# Patient Record
Sex: MALE | Race: WHITE | NOT HISPANIC OR LATINO
[De-identification: names, ages, dates, MRNs, and addresses within clinical notes are randomized per-mention and may not be internally consistent; named-entity substitution may affect disease eponyms.]

---

## 2017-03-06 ENCOUNTER — APPOINTMENT (OUTPATIENT)
Dept: HUMAN REPRODUCTION | Facility: CLINIC | Age: 42
End: 2017-03-06

## 2017-04-06 ENCOUNTER — APPOINTMENT (OUTPATIENT)
Dept: RADIOLOGY | Facility: CLINIC | Age: 42
End: 2017-04-06

## 2017-04-06 ENCOUNTER — OUTPATIENT (OUTPATIENT)
Dept: OUTPATIENT SERVICES | Facility: HOSPITAL | Age: 42
LOS: 1 days | End: 2017-04-06
Payer: COMMERCIAL

## 2017-04-06 DIAGNOSIS — Z00.8 ENCOUNTER FOR OTHER GENERAL EXAMINATION: ICD-10-CM

## 2017-04-06 PROCEDURE — 74020: CPT | Mod: 26

## 2017-04-06 PROCEDURE — 74020: CPT

## 2017-04-07 ENCOUNTER — APPOINTMENT (OUTPATIENT)
Dept: CT IMAGING | Facility: CLINIC | Age: 42
End: 2017-04-07

## 2017-04-07 ENCOUNTER — OUTPATIENT (OUTPATIENT)
Dept: OUTPATIENT SERVICES | Facility: HOSPITAL | Age: 42
LOS: 1 days | End: 2017-04-07
Payer: COMMERCIAL

## 2017-04-07 DIAGNOSIS — Z00.8 ENCOUNTER FOR OTHER GENERAL EXAMINATION: ICD-10-CM

## 2017-04-07 PROCEDURE — 74177 CT ABD & PELVIS W/CONTRAST: CPT

## 2020-11-19 ENCOUNTER — APPOINTMENT (OUTPATIENT)
Dept: RADIOLOGY | Facility: HOSPITAL | Age: 45
End: 2020-11-19
Payer: COMMERCIAL

## 2020-11-19 ENCOUNTER — RESULT REVIEW (OUTPATIENT)
Age: 45
End: 2020-11-19

## 2020-11-19 ENCOUNTER — OUTPATIENT (OUTPATIENT)
Dept: OUTPATIENT SERVICES | Facility: HOSPITAL | Age: 45
LOS: 1 days | End: 2020-11-19

## 2020-11-19 DIAGNOSIS — R10.11 RIGHT UPPER QUADRANT PAIN: ICD-10-CM

## 2020-11-19 DIAGNOSIS — K29.00 ACUTE GASTRITIS WITHOUT BLEEDING: ICD-10-CM

## 2020-11-19 PROCEDURE — 74220 X-RAY XM ESOPHAGUS 1CNTRST: CPT | Mod: 26

## 2022-12-27 ENCOUNTER — APPOINTMENT (OUTPATIENT)
Dept: ORTHOPEDIC SURGERY | Facility: CLINIC | Age: 47
End: 2022-12-27

## 2022-12-27 VITALS — HEIGHT: 74 IN | WEIGHT: 198 LBS | BODY MASS INDEX: 25.41 KG/M2

## 2022-12-27 DIAGNOSIS — M25.551 PAIN IN RIGHT HIP: ICD-10-CM

## 2022-12-27 DIAGNOSIS — S43.432A SUPERIOR GLENOID LABRUM LESION OF LEFT SHOULDER, INITIAL ENCOUNTER: ICD-10-CM

## 2022-12-27 DIAGNOSIS — M25.851 OTHER SPECIFIED JOINT DISORDERS, RIGHT HIP: ICD-10-CM

## 2022-12-27 PROCEDURE — 99203 OFFICE O/P NEW LOW 30 MIN: CPT

## 2022-12-27 PROCEDURE — 73502 X-RAY EXAM HIP UNI 2-3 VIEWS: CPT

## 2022-12-27 PROCEDURE — 73030 X-RAY EXAM OF SHOULDER: CPT | Mod: LT

## 2022-12-27 NOTE — PHYSICAL EXAM
[5___] : adduction 5[unfilled]/5 [Left] : left shoulder [There are no fractures, subluxations or dislocations. No significant abnormalities are seen] : There are no fractures, subluxations or dislocations. No significant abnormalities are seen [Right] : right hip with pelvis [AP] : anteroposterior [Lateral] : lateral [Mild arthritis (Tonnis Grade 1)] : Mild arthritis (Tonnis Grade 1) [de-identified] : positive biceps active test [] : no tenderness

## 2022-12-27 NOTE — DISCUSSION/SUMMARY
[de-identified] : Had shoulder injury 6 months ago, has been rehabbing it with PT and on his own. Needs MRI to r/o labral tear. The hip has mild OA already, activity modifications, NSAIDs discussed

## 2022-12-27 NOTE — HISTORY OF PRESENT ILLNESS
[Gradual] : gradual [7] : 7 [5] : 5 [Dull/Aching] : dull/aching [Intermittent] : intermittent [Nothing helps with pain getting better] : Nothing helps with pain getting better [de-identified] : Injured left shoulder doing weights about 6 months ago. Continues to have pain with certain motions, has had to modify his weight lifting. No clicking or popping, no neck pain at present. Also longer standing right hip pain ryan with running. Has been getting stretched out with PT [] : no [FreeTextEntry1] : LT shoulder RT Hip [FreeTextEntry5] : LT shoulder pain which started half a year ago and hip pain which started a couple years ago. Pt denies traumatic injury. Pt denies N/T. Pain has gotten worse over time.

## 2023-01-03 ENCOUNTER — APPOINTMENT (OUTPATIENT)
Dept: MRI IMAGING | Facility: CLINIC | Age: 48
End: 2023-01-03

## 2023-01-06 ENCOUNTER — APPOINTMENT (OUTPATIENT)
Dept: ORTHOPEDIC SURGERY | Facility: CLINIC | Age: 48
End: 2023-01-06

## 2023-01-16 ENCOUNTER — FORM ENCOUNTER (OUTPATIENT)
Age: 48
End: 2023-01-16

## 2023-01-17 ENCOUNTER — APPOINTMENT (OUTPATIENT)
Dept: MRI IMAGING | Facility: CLINIC | Age: 48
End: 2023-01-17
Payer: COMMERCIAL

## 2023-01-17 PROCEDURE — 73221 MRI JOINT UPR EXTREM W/O DYE: CPT

## 2023-01-24 ENCOUNTER — APPOINTMENT (OUTPATIENT)
Dept: ORTHOPEDIC SURGERY | Facility: CLINIC | Age: 48
End: 2023-01-24
Payer: COMMERCIAL

## 2023-01-24 VITALS — WEIGHT: 198 LBS | BODY MASS INDEX: 25.41 KG/M2 | HEIGHT: 74 IN

## 2023-01-24 DIAGNOSIS — M75.41 IMPINGEMENT SYNDROME OF RIGHT SHOULDER: ICD-10-CM

## 2023-01-24 PROCEDURE — 99213 OFFICE O/P EST LOW 20 MIN: CPT | Mod: 25

## 2023-01-24 PROCEDURE — 20610 DRAIN/INJ JOINT/BURSA W/O US: CPT

## 2023-01-24 NOTE — PHYSICAL EXAM
[Left] : left shoulder [There are no fractures, subluxations or dislocations. No significant abnormalities are seen] : There are no fractures, subluxations or dislocations. No significant abnormalities are seen [5___] : adduction 5[unfilled]/5 [Right] : right hip with pelvis [AP] : anteroposterior [Lateral] : lateral [Mild arthritis (Tonnis Grade 1)] : Mild arthritis (Tonnis Grade 1) [de-identified] : positive biceps active test [] : no tenderness

## 2023-01-24 NOTE — DATA REVIEWED
[MRI] : MRI [Right] : of the right [Shoulder] : shoulder [I reviewed the films/CD and agree] : I reviewed the films/CD and agree

## 2023-01-24 NOTE — REASON FOR VISIT
[FreeTextEntry2] : 1/24/23- Had MRI: Impression: \par 1. AC joint arthrosis.\par 2. Rotator cuff tendinopathy. Interstitial fraying of the subscapularis at the proximal insertion.\par 3. Capsular thickening anteriorly which can be seen with adhesive capsulitis.\par 4. Degeneration of superior labrum. Biceps tendinopathy with peritendinous edema.\par 5. Mild arthrosis of the glenohumeral joint with joint effusion.

## 2023-01-24 NOTE — HISTORY OF PRESENT ILLNESS
[Gradual] : gradual [7] : 7 [5] : 5 [Dull/Aching] : dull/aching [Intermittent] : intermittent [Nothing helps with pain getting better] : Nothing helps with pain getting better [de-identified] : Injured left shoulder doing weights about 6 months ago. Continues to have pain with certain motions, has had to modify his weight lifting. No clicking or popping, no neck pain at present. Also longer standing right hip pain ryan with running. Has been getting stretched out with PT [] : no [FreeTextEntry1] : LT shoulder RT Hip [FreeTextEntry5] : LT shoulder pain which started half a year ago and hip pain which started a couple years ago. Pt denies traumatic injury. Pt denies N/T. Pain has gotten worse over time.

## 2023-01-24 NOTE — PROCEDURE
[Large Joint Injection] : Large joint injection [Right] : of the right [Subacromial Space] : subacromial space [Pain] : pain [Alcohol] : alcohol [Betadine] : betadine [Ethyl Chloride sprayed topically] : ethyl chloride sprayed topically [Sterile technique used] : sterile technique used [___ cc    6mg] :  Betamethasone (Celestone) ~Vcc of 6mg [___ cc    1%] : Lidocaine ~Vcc of 1%

## 2023-01-24 NOTE — ASSESSMENT
[FreeTextEntry1] : MRI reviewed, no surgical issues. will try cortisone injection and see how he responds

## 2023-01-31 ENCOUNTER — APPOINTMENT (OUTPATIENT)
Dept: ORTHOPEDIC SURGERY | Facility: CLINIC | Age: 48
End: 2023-01-31

## 2023-12-25 ENCOUNTER — NON-APPOINTMENT (OUTPATIENT)
Age: 48
End: 2023-12-25

## 2023-12-26 ENCOUNTER — OUTPATIENT (OUTPATIENT)
Dept: OUTPATIENT SERVICES | Facility: HOSPITAL | Age: 48
LOS: 1 days | End: 2023-12-26
Payer: COMMERCIAL

## 2023-12-26 ENCOUNTER — RESULT REVIEW (OUTPATIENT)
Age: 48
End: 2023-12-26

## 2023-12-26 DIAGNOSIS — R13.10 DYSPHAGIA, UNSPECIFIED: ICD-10-CM

## 2023-12-26 DIAGNOSIS — Z00.00 ENCOUNTER FOR GENERAL ADULT MEDICAL EXAMINATION WITHOUT ABNORMAL FINDINGS: ICD-10-CM

## 2023-12-26 DIAGNOSIS — R10.13 EPIGASTRIC PAIN: ICD-10-CM

## 2023-12-26 PROCEDURE — 74230 X-RAY XM SWLNG FUNCJ C+: CPT | Mod: 26

## 2023-12-26 PROCEDURE — 74220 X-RAY XM ESOPHAGUS 1CNTRST: CPT

## 2023-12-26 PROCEDURE — 74230 X-RAY XM SWLNG FUNCJ C+: CPT

## 2024-01-12 ENCOUNTER — APPOINTMENT (OUTPATIENT)
Dept: GASTROENTEROLOGY | Facility: CLINIC | Age: 49
End: 2024-01-12